# Patient Record
Sex: FEMALE | Race: BLACK OR AFRICAN AMERICAN | NOT HISPANIC OR LATINO | URBAN - METROPOLITAN AREA
[De-identification: names, ages, dates, MRNs, and addresses within clinical notes are randomized per-mention and may not be internally consistent; named-entity substitution may affect disease eponyms.]

---
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
## 2018-09-16 NOTE — ED PROVIDER NOTE - NS ED ATTENDING STATEMENT MOD
Patient education given to patient regarding low fat diet, cholecystectomy procedure, and discharge care after procedure. I have personally seen and examined this patient.  I have fully participated in the care of this patient. I have reviewed all pertinent clinical information, including history, physical exam, plan and the Resident’s note and agree except as noted.

## 2020-02-12 NOTE — ED BEHAVIORAL HEALTH ASSESSMENT NOTE - NS ED BHA AXIS II PRIMARY CODE FT
Subjective      Patient ID: Allison Hilario is a 61 y.o. female.    HPI  Not well for two days with mild cough initially, yesterday all the symptoms suddenly began with bad headache, sore throat, throat irritation, cough, feverish, generalized aches,eyes hurt.   No runny nose.   Some nausea, no diarrhea.     No others ill at home.   Pt moving to [place]. Was in [facility] 2 weeks ago.     The following have been reviewed and updated as appropriate in this visit:  Allergies  Meds  Problems       Review of Systems   Constitutional: Positive for fatigue and fever.   HENT: Positive for sore throat. Negative for congestion, postnasal drip and sinus pain.    Respiratory: Positive for cough. Negative for shortness of breath.    Cardiovascular: Negative for chest pain and palpitations.   Genitourinary: Negative for difficulty urinating.   Musculoskeletal: Positive for myalgias.   Allergic/Immunologic: Positive for immunocompromised state (due to RA. on immunosuppressants ).   Neurological: Positive for headaches.     Current Outpatient Medications   Medication Sig Dispense Refill   • calcium carbonate-vitamin D3 (CALTRATE 600 + D) 600 mg (1,500 mg)-800 unit tablet,chewable one daily     • etanercept (ENBREL) 25 mg/0.5mL (0.51) injection Inject 25 mg under the skin.     • folic acid (FOLVITE) 1 mg tablet 1 mg.     • magnesium 250 mg tablet 250 mg.     • methotrexate sodium (RHEUMATREX) 2.5 mg tablets,dose pack Take 7.5 mg by mouth once a week.      • tobramycin-dexamethasone (TOBRADEX) ophthalmic solution One drop to left eye 3 x a day for 5 days 5 mL 1   • albuterol sulfate 90 mcg/actuation inhaler Inhale 2 puffs every 6 (six) hours as needed for wheezing. 1 Package 0   • azithromycin (ZITHROMAX) 250 mg tablet Take 2 tablets the first day, then 1 tablet daily for 4 days. 6 tablet 0     No current facility-administered medications for this visit.      Past Medical History:   Diagnosis Date   • Arthritis    • Breast  "cancer (CMS/HCC)      Family History   Problem Relation Age of Onset   • Brain cancer Biological Mother    • Hypertension Biological Mother    • Heart failure Biological Father    • Diabetes Biological Father    • Breast cancer Biological Sister    • Melanoma Biological Brother      Allergies   Allergen Reactions   • Penicillins Other (see comments)     Unknown      Past Surgical History:   Procedure Laterality Date   • MASTECTOMY Right      Social History     Socioeconomic History   • Marital status:      Spouse name: None   • Number of children: None   • Years of education: None   • Highest education level: None   Occupational History   • None   Social Needs   • Financial resource strain: None   • Food insecurity:     Worry: None     Inability: None   • Transportation needs:     Medical: None     Non-medical: None   Tobacco Use   • Smoking status: Never Smoker   • Smokeless tobacco: Never Used   Substance and Sexual Activity   • Alcohol use: No   • Drug use: No   • Sexual activity: None   Lifestyle   • Physical activity:     Days per week: None     Minutes per session: None   • Stress: None   Relationships   • Social connections:     Talks on phone: None     Gets together: None     Attends Shinto service: None     Active member of club or organization: None     Attends meetings of clubs or organizations: None     Relationship status: None   • Intimate partner violence:     Fear of current or ex partner: None     Emotionally abused: None     Physically abused: None     Forced sexual activity: None   Other Topics Concern   • None   Social History Narrative   • None       Objective     Vitals:   Vitals:    02/12/20 1402   BP: 138/66   BP Location: Left upper arm   Patient Position: Sitting   Pulse: 98   Resp: 14   Temp: 37.2 °C (98.9 °F)   TempSrc: Oral   SpO2: 97%   Weight: 62.6 kg (138 lb)   Height: 1.626 m (5' 4\")       Physical Exam   Constitutional: She is oriented to person, place, and time.   Appears " tired. Appears ill but not toxic   Harsh cough    HENT:   Right Ear: External ear normal.   Left Ear: External ear normal.   Mouth/Throat: Oropharynx is clear and moist.   Cardiovascular: Normal rate and regular rhythm.   Pulmonary/Chest:   Rales at rt base   Does not clear with cough    Musculoskeletal:   Tender muscles throughout    Lymphadenopathy:     She has no cervical adenopathy.   Neurological: She is oriented to person, place, and time.   Psychiatric: She has a normal mood and affect.   Nursing note and vitals reviewed.      Labs  No results found for: WBC, HGB, HCT, PLT, CHOL, TRIG, HDL, LDLCALC, ALT, AST, NA, K, GLUCOSE, CL, CREATININE, BUN, CO2, TSH, T4F, HGBA1C, MCRALBCREAT, EGFR, PSA    Assessment/Plan   Problem List Items Addressed This Visit        Immune    Immunosuppressed status (CMS/[facility])      Other Visit Diagnoses     Pneumonia of right lower lobe due to Streptococcus pneumoniae (CMS/[facility])    -  Primary    Relevant Medications    azithromycin (ZITHROMAX) 250 mg tablet    albuterol sulfate 90 mcg/actuation inhaler        Pneumonia rt base. santhosh pneumococcal.   Zpack. Albuterol, mucinex, fluids, rest.   Flu test negative.     Moe Lopez MD  2/12/2020     R6

## 2020-11-19 NOTE — ED ADULT NURSE NOTE - CAS DISCH ACCOMP BY
Apixaban/Eliquis is used to treat and prevent blood clots. If you are not able to swallow the tablets whole, they may be crushed and mixed in water, apple juice, or applesauce and promptly taken within four hours. Never skip a dose of Apixaban/Eliquis. If you forget to take your Apixaban/Eliquis, take a dose as soon as you remember. If it is almost time for your next Apixaban/Eliquis dose, wait until then and take a regular dose. DO NOT take an extra pill to ‘catch up’.  NEVER TAKE A DOUBLE DOSE. Notify your doctor that you missed a dose. Take Apixaban/Eliquis at the same time each morning and evening. Apixaban/Eliquis may be taken with other medication or food. Self/Parent(s)/Family

## 2025-02-03 NOTE — ED BEHAVIORAL HEALTH ASSESSMENT NOTE - NS ED BHA ED COURSE PSYCHIATRIC MEDICATION GIVEN
Spoke with patient regarding procedure scheduled on 2.14     Arrival time 0845     Has patient been sick with fever or on antibiotics within the last 7 days? No     Does the patient have any open wounds, sores or rashes? No     Does the patient have any recent fractures? no     Has patient received a vaccination within the last 7 days? No     Received the COVID vaccination? yes     Has the patient stopped all medications as directed? na     Does patient have a pacemaker, defibrillator, or implantable stimulator? No     Does the patient have a ride to and from procedure and someone reliable to remain with patient?  WIFE     Is the patient diabetic? YES      Does the patient have sleep apnea? Or use O2 at home? no     Is the patient receiving sedation? Yes      Is the patient instructed to remain NPO beginning at midnight the night before their procedure? yes     Procedure location confirmed with patient? Yes     Covid- Denies signs/symptoms. Instructed to notify PAT/MD if any changes.  
None